# Patient Record
Sex: MALE | Race: WHITE | Employment: FULL TIME | URBAN - METROPOLITAN AREA
[De-identification: names, ages, dates, MRNs, and addresses within clinical notes are randomized per-mention and may not be internally consistent; named-entity substitution may affect disease eponyms.]

---

## 2017-08-14 ENCOUNTER — OFFICE VISIT (OUTPATIENT)
Dept: INTERNAL MEDICINE CLINIC | Facility: CLINIC | Age: 42
End: 2017-08-14

## 2017-08-14 VITALS
OXYGEN SATURATION: 98 % | DIASTOLIC BLOOD PRESSURE: 88 MMHG | HEIGHT: 69.5 IN | BODY MASS INDEX: 25.77 KG/M2 | RESPIRATION RATE: 16 BRPM | HEART RATE: 109 BPM | WEIGHT: 178 LBS | SYSTOLIC BLOOD PRESSURE: 122 MMHG

## 2017-08-14 DIAGNOSIS — F42.9 OBSESSIVE-COMPULSIVE DISORDER, UNSPECIFIED TYPE: ICD-10-CM

## 2017-08-14 DIAGNOSIS — F98.8 ADD (ATTENTION DEFICIT DISORDER) WITHOUT HYPERACTIVITY: ICD-10-CM

## 2017-08-14 DIAGNOSIS — Z00.00 ANNUAL PHYSICAL EXAM: Primary | ICD-10-CM

## 2017-08-14 DIAGNOSIS — L40.50 PSORIATIC ARTHRITIS (HCC): ICD-10-CM

## 2017-08-14 PROBLEM — Z83.3 FAMILY HISTORY OF DIABETES MELLITUS IN MOTHER: Status: ACTIVE | Noted: 2017-08-14

## 2017-08-14 PROBLEM — L40.9 PSORIASIS: Status: ACTIVE | Noted: 2017-08-14

## 2017-08-14 PROBLEM — Z82.49 FAMILY HISTORY OF CORONARY ARTERY DISEASE IN FATHER: Status: ACTIVE | Noted: 2017-08-14

## 2017-08-14 PROCEDURE — 99386 PREV VISIT NEW AGE 40-64: CPT | Performed by: FAMILY MEDICINE

## 2017-08-14 RX ORDER — SERTRALINE HYDROCHLORIDE 25 MG/1
25 TABLET, FILM COATED ORAL DAILY
COMMUNITY
End: 2017-08-14 | Stop reason: DRUGHIGH

## 2017-08-14 RX ORDER — SERTRALINE HYDROCHLORIDE 100 MG/1
100 TABLET, FILM COATED ORAL DAILY
Qty: 90 TABLET | Refills: 1 | Status: SHIPPED | OUTPATIENT
Start: 2017-08-14 | End: 2017-09-18

## 2017-08-14 NOTE — PROGRESS NOTES
Isaac Baeza is a 43year old male who presents for a complete physical exam.  NOT FASTING  HPI:     Concerns:  CHRONIC PSORIATIC ARTHRITIS-->WAS ON EMBREL FORMERLY WHICH WORKED REALLY WELL-->DUE TO INSURANCE COVERAGE NOW ON [employer] WHICH WORKS [address] headaches  PSYCHE: H/O OCD (ABLE TO FUNCTION) AND SOME TROUBLE WITH FOCUS--CHRONIC. HOWEVER, DENIES DEPRESSION.     EXAM:   /88 (BP Location: Right arm, Patient Position: Sitting, Cuff Size: adult)   Pulse 109   Resp 16   Ht 69.5\"   Wt 178 lb   SpO2

## 2017-08-19 ENCOUNTER — NURSE ONLY (OUTPATIENT)
Dept: INTERNAL MEDICINE CLINIC | Facility: CLINIC | Age: 42
End: 2017-08-19

## 2017-08-19 DIAGNOSIS — L40.50 PSORIATIC ARTHRITIS (HCC): ICD-10-CM

## 2017-08-19 DIAGNOSIS — L40.9 PSORIASIS: ICD-10-CM

## 2017-08-19 DIAGNOSIS — Z83.3 FAMILY HISTORY OF DIABETES MELLITUS IN MOTHER: ICD-10-CM

## 2017-08-19 LAB
ALBUMIN SERPL BCP-MCNC: 4.3 G/DL (ref 3.5–4.8)
ALBUMIN/GLOB SERPL: 2 {RATIO} (ref 1–2)
ALP SERPL-CCNC: 43 U/L (ref 32–100)
ALT SERPL-CCNC: 42 U/L (ref 17–63)
ANION GAP SERPL CALC-SCNC: 8 MMOL/L (ref 0–18)
AST SERPL-CCNC: 30 U/L (ref 15–41)
BASOPHILS # BLD: 0 K/UL (ref 0–0.2)
BASOPHILS NFR BLD: 1 %
BILIRUB SERPL-MCNC: 0.7 MG/DL (ref 0.3–1.2)
BUN SERPL-MCNC: 17 MG/DL (ref 8–20)
BUN/CREAT SERPL: 15.9 (ref 10–20)
CALCIUM SERPL-MCNC: 10 MG/DL (ref 8.5–10.5)
CHLORIDE SERPL-SCNC: 106 MMOL/L (ref 95–110)
CHOLEST SERPL-MCNC: 237 MG/DL (ref 110–200)
CO2 SERPL-SCNC: 26 MMOL/L (ref 22–32)
CREAT SERPL-MCNC: 1.07 MG/DL (ref 0.5–1.5)
EOSINOPHIL # BLD: 0.1 K/UL (ref 0–0.7)
EOSINOPHIL NFR BLD: 1 %
ERYTHROCYTE [DISTWIDTH] IN BLOOD BY AUTOMATED COUNT: 14.3 % (ref 11–15)
GLOBULIN PLAS-MCNC: 2.1 G/DL (ref 2.5–3.7)
GLUCOSE SERPL-MCNC: 93 MG/DL (ref 70–99)
HCT VFR BLD AUTO: 45.9 % (ref 41–52)
HDLC SERPL-MCNC: 65 MG/DL
HGB BLD-MCNC: 15.5 G/DL (ref 13.5–17.5)
LDLC SERPL CALC-MCNC: 138 MG/DL (ref 0–99)
LYMPHOCYTES # BLD: 1.9 K/UL (ref 1–4)
LYMPHOCYTES NFR BLD: 31 %
MCH RBC QN AUTO: 29.2 PG (ref 27–32)
MCHC RBC AUTO-ENTMCNC: 33.8 G/DL (ref 32–37)
MCV RBC AUTO: 86.3 FL (ref 80–100)
MONOCYTES # BLD: 0.6 K/UL (ref 0–1)
MONOCYTES NFR BLD: 9 %
NEUTROPHILS # BLD AUTO: 3.6 K/UL (ref 1.8–7.7)
NEUTROPHILS NFR BLD: 58 %
NONHDLC SERPL-MCNC: 172 MG/DL
OSMOLALITY UR CALC.SUM OF ELEC: 291 MOSM/KG (ref 275–295)
PLATELET # BLD AUTO: 182 K/UL (ref 140–400)
PMV BLD AUTO: 7.7 FL (ref 7.4–10.3)
POTASSIUM SERPL-SCNC: 5.5 MMOL/L (ref 3.3–5.1)
PROT SERPL-MCNC: 6.4 G/DL (ref 5.9–8.4)
RBC # BLD AUTO: 5.32 M/UL (ref 4.5–5.9)
SODIUM SERPL-SCNC: 140 MMOL/L (ref 136–144)
TRIGL SERPL-MCNC: 172 MG/DL (ref 1–149)
WBC # BLD AUTO: 6.2 K/UL (ref 4–11)

## 2017-08-19 PROCEDURE — 85025 COMPLETE CBC W/AUTO DIFF WBC: CPT | Performed by: FAMILY MEDICINE

## 2017-08-19 PROCEDURE — 80053 COMPREHEN METABOLIC PANEL: CPT | Performed by: FAMILY MEDICINE

## 2017-08-19 PROCEDURE — 36415 COLL VENOUS BLD VENIPUNCTURE: CPT | Performed by: FAMILY MEDICINE

## 2017-08-19 PROCEDURE — 80061 LIPID PANEL: CPT | Performed by: FAMILY MEDICINE

## 2017-08-19 NOTE — PROGRESS NOTES
Pt presented to clinic today for blood draw. Per physician able to draw orders. Orders  documented within chart. Pt tolerated lab draw well.  verified.   Orders drawn include: cmp, lipid , cbc   Site of draw: rt kristopher Bradley Kwaku, CMA

## 2017-09-12 PROBLEM — F41.8 OTHER SPECIFIED ANXIETY DISORDERS: Status: ACTIVE | Noted: 2017-08-14

## 2017-09-12 PROBLEM — F90.2 ATTENTION DEFICIT HYPERACTIVITY DISORDER (ADHD), COMBINED TYPE: Status: ACTIVE | Noted: 2017-09-12

## 2017-10-11 PROBLEM — F41.1 GENERALIZED ANXIETY DISORDER: Status: ACTIVE | Noted: 2017-08-14

## 2017-12-07 ENCOUNTER — TELEPHONE (OUTPATIENT)
Dept: INTERNAL MEDICINE CLINIC | Facility: CLINIC | Age: 42
End: 2017-12-07

## 2017-12-07 RX ORDER — ADALIMUMAB 40MG/0.8ML
KIT SUBCUTANEOUS
Refills: 4 | COMMUNITY
Start: 2017-11-21 | End: 2018-01-30 | Stop reason: ALTCHOICE

## 2017-12-07 NOTE — TELEPHONE ENCOUNTER
Per EPIC encounters patient did not go to an in network urgent care, he went to an external network urgent care

## 2018-01-30 ENCOUNTER — OFFICE VISIT (OUTPATIENT)
Dept: INTERNAL MEDICINE CLINIC | Facility: CLINIC | Age: 43
End: 2018-01-30

## 2018-01-30 VITALS
HEART RATE: 76 BPM | RESPIRATION RATE: 17 BRPM | SYSTOLIC BLOOD PRESSURE: 136 MMHG | OXYGEN SATURATION: 98 % | DIASTOLIC BLOOD PRESSURE: 100 MMHG | WEIGHT: 186 LBS | BODY MASS INDEX: 27.87 KG/M2 | HEIGHT: 68.5 IN

## 2018-01-30 DIAGNOSIS — L40.50 PSORIATIC ARTHRITIS (HCC): Primary | ICD-10-CM

## 2018-01-30 DIAGNOSIS — Z86.39 H/O HYPERKALEMIA: ICD-10-CM

## 2018-01-30 DIAGNOSIS — F41.1 GENERALIZED ANXIETY DISORDER: ICD-10-CM

## 2018-01-30 PROCEDURE — 99213 OFFICE O/P EST LOW 20 MIN: CPT | Performed by: FAMILY MEDICINE

## 2018-01-30 RX ORDER — PREDNISONE 10 MG/1
10 TABLET ORAL DAILY PRN
Qty: 30 TABLET | Refills: 0 | Status: SHIPPED | OUTPATIENT
Start: 2018-01-30 | End: 2018-10-30 | Stop reason: ALTCHOICE

## 2018-01-30 RX ORDER — SERTRALINE HYDROCHLORIDE 100 MG/1
200 TABLET, FILM COATED ORAL DAILY
Qty: 180 TABLET | Refills: 1 | Status: SHIPPED | OUTPATIENT
Start: 2018-01-30 | End: 2018-10-30

## 2018-01-31 ENCOUNTER — TELEPHONE (OUTPATIENT)
Dept: INTERNAL MEDICINE CLINIC | Facility: CLINIC | Age: 43
End: 2018-01-31

## 2018-01-31 NOTE — PROGRESS NOTES
CC:  Follow - Up (Pt presents to clinic for f/up on Humira-->pt states not working. )      Hx of CC:  F/UP ON PSORIATIC ARTHRITIS-->PATIENT RELATES HUMIRA INJECTIONS EVERY 2 WEEKS NOT AS EFFECTIVE AS FORMER EMBREL INJECTIONS WEEKLY FOR CONTROLLING JOINT/HA

## 2018-02-08 ENCOUNTER — TELEPHONE (OUTPATIENT)
Dept: INTERNAL MEDICINE CLINIC | Facility: CLINIC | Age: 43
End: 2018-02-08

## 2018-04-11 ENCOUNTER — TELEPHONE (OUTPATIENT)
Dept: INTERNAL MEDICINE CLINIC | Facility: CLINIC | Age: 43
End: 2018-04-11

## 2018-04-11 DIAGNOSIS — L40.9 PSORIASIS: Primary | ICD-10-CM

## 2018-04-11 DIAGNOSIS — L40.50 PSORIATIC ARTHRITIS (HCC): ICD-10-CM

## 2018-04-18 ENCOUNTER — TELEPHONE (OUTPATIENT)
Dept: INTERNAL MEDICINE CLINIC | Facility: CLINIC | Age: 43
End: 2018-04-18

## 2018-06-12 ENCOUNTER — OFFICE VISIT (OUTPATIENT)
Dept: INTERNAL MEDICINE CLINIC | Facility: CLINIC | Age: 43
End: 2018-06-12

## 2018-06-12 VITALS
HEIGHT: 68.5 IN | OXYGEN SATURATION: 99 % | RESPIRATION RATE: 17 BRPM | HEART RATE: 79 BPM | DIASTOLIC BLOOD PRESSURE: 96 MMHG | SYSTOLIC BLOOD PRESSURE: 132 MMHG | BODY MASS INDEX: 26.67 KG/M2 | WEIGHT: 178 LBS

## 2018-06-12 DIAGNOSIS — M17.31 POST-TRAUMATIC OSTEOARTHRITIS OF RIGHT KNEE: ICD-10-CM

## 2018-06-12 DIAGNOSIS — F43.9 STRESS AT HOME: ICD-10-CM

## 2018-06-12 DIAGNOSIS — L40.9 PSORIASIS: ICD-10-CM

## 2018-06-12 DIAGNOSIS — Z00.00 ANNUAL PHYSICAL EXAM: ICD-10-CM

## 2018-06-12 DIAGNOSIS — F41.1 GENERALIZED ANXIETY DISORDER: ICD-10-CM

## 2018-06-12 DIAGNOSIS — L40.50 PSORIATIC ARTHRITIS (HCC): Primary | ICD-10-CM

## 2018-06-12 PROCEDURE — 20610 DRAIN/INJ JOINT/BURSA W/O US: CPT | Performed by: FAMILY MEDICINE

## 2018-06-12 PROCEDURE — 99214 OFFICE O/P EST MOD 30 MIN: CPT | Performed by: FAMILY MEDICINE

## 2018-06-12 RX ORDER — ETANERCEPT 50 MG/ML
50 SOLUTION SUBCUTANEOUS WEEKLY
Qty: 4 SYRINGE | Refills: 5 | Status: SHIPPED | OUTPATIENT
Start: 2018-06-12 | End: 2019-01-21

## 2018-06-12 RX ORDER — TRIAMCINOLONE ACETONIDE 40 MG/ML
40 INJECTION, SUSPENSION INTRA-ARTICULAR; INTRAMUSCULAR ONCE
Status: COMPLETED | OUTPATIENT
Start: 2018-06-12 | End: 2018-06-12

## 2018-06-12 RX ORDER — ATENOLOL 50 MG/1
50 TABLET ORAL DAILY
Qty: 90 TABLET | Refills: 1 | Status: SHIPPED | OUTPATIENT
Start: 2018-06-12 | End: 2019-06-07

## 2018-06-12 RX ORDER — ETANERCEPT 50 MG/ML
50 SOLUTION SUBCUTANEOUS WEEKLY
COMMUNITY
Start: 2018-05-29 | End: 2018-06-12

## 2018-06-12 NOTE — PROGRESS NOTES
CC:  Knee Pain (Pt c/o right knee pain-->had surgery at age 13. Cartilage issues. ) and Sweats (Pt c/o strange smelling sweat x months. )      Hx of CC:  CHRONIC PROGRESSING RIGHT KNEE PAIN. HAD PARTIAL MENISCECTOMY 27 YEARS AGO.   [address] INCREASE HYDRATION TO PREVENT RHABDOMYOLYSIS WITH EXERCISE  DECREASE INTENSITY OF WORK OUT DUE TO ABOVE AND RIGHT KNEE OA    - COMP METABOLIC PANEL (14); Future  - LIPID PANEL; Future    4.  Stress at home  STRONGLY RECOMMEND PERIODIC PERSONAL/COUPLE BREAKS

## 2018-06-30 ENCOUNTER — APPOINTMENT (OUTPATIENT)
Dept: LAB | Facility: HOSPITAL | Age: 43
End: 2018-06-30
Attending: FAMILY MEDICINE
Payer: COMMERCIAL

## 2018-06-30 DIAGNOSIS — L40.50 PSORIATIC ARTHRITIS (HCC): ICD-10-CM

## 2018-06-30 DIAGNOSIS — L40.9 PSORIASIS: ICD-10-CM

## 2018-06-30 DIAGNOSIS — Z00.00 ANNUAL PHYSICAL EXAM: ICD-10-CM

## 2018-06-30 LAB
CHOLEST SERPL-MCNC: 215 MG/DL (ref 110–200)
ERYTHROCYTE [SEDIMENTATION RATE] IN BLOOD: 6 MM/HR (ref 0–15)
HDLC SERPL-MCNC: 90 MG/DL
LDLC SERPL CALC-MCNC: 116 MG/DL (ref 0–99)
NONHDLC SERPL-MCNC: 125 MG/DL
TRIGL SERPL-MCNC: 45 MG/DL (ref 1–149)

## 2018-06-30 PROCEDURE — 85025 COMPLETE CBC W/AUTO DIFF WBC: CPT | Performed by: FAMILY MEDICINE

## 2018-06-30 PROCEDURE — 85652 RBC SED RATE AUTOMATED: CPT

## 2018-06-30 PROCEDURE — 80061 LIPID PANEL: CPT

## 2018-06-30 PROCEDURE — 36415 COLL VENOUS BLD VENIPUNCTURE: CPT | Performed by: FAMILY MEDICINE

## 2018-06-30 PROCEDURE — 80053 COMPREHEN METABOLIC PANEL: CPT | Performed by: FAMILY MEDICINE

## 2018-07-05 ENCOUNTER — TELEPHONE (OUTPATIENT)
Dept: INTERNAL MEDICINE CLINIC | Facility: CLINIC | Age: 43
End: 2018-07-05

## 2018-07-09 ENCOUNTER — TELEPHONE (OUTPATIENT)
Dept: INTERNAL MEDICINE CLINIC | Facility: CLINIC | Age: 43
End: 2018-07-09

## 2018-07-17 ENCOUNTER — TELEPHONE (OUTPATIENT)
Dept: INTERNAL MEDICINE CLINIC | Facility: CLINIC | Age: 43
End: 2018-07-17

## 2018-07-17 DIAGNOSIS — K04.7 INFECTION OF TOOTH: Primary | ICD-10-CM

## 2018-07-17 RX ORDER — PENICILLIN V POTASSIUM 500 MG/1
500 TABLET ORAL 3 TIMES DAILY
Qty: 21 TABLET | Refills: 0 | Status: SHIPPED | OUTPATIENT
Start: 2018-07-17 | End: 2018-10-30 | Stop reason: ALTCHOICE

## 2018-07-17 NOTE — TELEPHONE ENCOUNTER
C/o infected tooth--will schedule appt for root canal next week. Distant episode (> 20 years ago) of mild rash with pcn but has had it since then without problems.

## 2018-10-30 ENCOUNTER — OFFICE VISIT (OUTPATIENT)
Dept: INTERNAL MEDICINE CLINIC | Facility: CLINIC | Age: 43
End: 2018-10-30
Payer: COMMERCIAL

## 2018-10-30 VITALS
BODY MASS INDEX: 26.82 KG/M2 | OXYGEN SATURATION: 99 % | SYSTOLIC BLOOD PRESSURE: 116 MMHG | HEART RATE: 66 BPM | DIASTOLIC BLOOD PRESSURE: 82 MMHG | WEIGHT: 179 LBS | RESPIRATION RATE: 17 BRPM | HEIGHT: 68.5 IN

## 2018-10-30 DIAGNOSIS — R73.01 ELEVATED FASTING GLUCOSE: ICD-10-CM

## 2018-10-30 DIAGNOSIS — L40.50 PSORIATIC ARTHRITIS (HCC): ICD-10-CM

## 2018-10-30 DIAGNOSIS — F90.2 ATTENTION DEFICIT HYPERACTIVITY DISORDER (ADHD), COMBINED TYPE: ICD-10-CM

## 2018-10-30 DIAGNOSIS — M17.31 POST-TRAUMATIC OSTEOARTHRITIS OF RIGHT KNEE: Primary | ICD-10-CM

## 2018-10-30 DIAGNOSIS — F43.9 STRESS AT HOME: ICD-10-CM

## 2018-10-30 PROCEDURE — 20610 DRAIN/INJ JOINT/BURSA W/O US: CPT | Performed by: FAMILY MEDICINE

## 2018-10-30 PROCEDURE — 99214 OFFICE O/P EST MOD 30 MIN: CPT | Performed by: FAMILY MEDICINE

## 2018-10-30 RX ORDER — TRIAMCINOLONE ACETONIDE 40 MG/ML
40 INJECTION, SUSPENSION INTRA-ARTICULAR; INTRAMUSCULAR ONCE
Status: COMPLETED | OUTPATIENT
Start: 2018-10-30 | End: 2018-10-30

## 2018-10-30 RX ADMIN — TRIAMCINOLONE ACETONIDE 40 MG: 40 INJECTION, SUSPENSION INTRA-ARTICULAR; INTRAMUSCULAR at 18:30:00

## 2018-10-31 PROBLEM — R73.01 ELEVATED FASTING GLUCOSE: Status: ACTIVE | Noted: 2018-10-31

## 2018-10-31 NOTE — PROGRESS NOTES
CC:  Medication Follow-Up (Pt presents to clinic for medication f/up and refills. ) and Knee Pain (Right knee pain-->wants injection.)      Hx of CC:  RESPONDED WELL TO FORMER KENALOG INJECTION ON RIGHT KNEE & REQUESTING ANOTHER ONE.   H/O MULTIPLE INJURIES (KENALOG-40) 40 MG/ML injection 40 mg; Inject 1 mL (40 mg total) into the articular space once WITH 1 CC LIDOCAINE. 4. Stress at home  DISCUSSED @ LENGTH, RECOMMEND FORMAL PSYCH EVALUATION FOR MOOD DISORDER    5.  Elevated fasting glucose  DISCUSSED, MON

## 2018-11-17 ENCOUNTER — HOSPITAL (OUTPATIENT)
Dept: OTHER | Age: 43
End: 2018-11-17
Attending: EMERGENCY MEDICINE

## 2018-11-17 ENCOUNTER — IMAGING SERVICES (OUTPATIENT)
Dept: OTHER | Age: 43
End: 2018-11-17

## 2018-11-17 ENCOUNTER — TELEPHONE (OUTPATIENT)
Dept: FAMILY MEDICINE CLINIC | Facility: CLINIC | Age: 43
End: 2018-11-17

## 2018-11-17 LAB
ALBUMIN SERPL-MCNC: 4 GM/DL (ref 3.6–5.1)
ALP SERPL-CCNC: 57 UNIT/L (ref 45–117)
ALT SERPL-CCNC: 32 UNIT/L
ANALYZER ANC (IANC): ABNORMAL
ANION GAP SERPL CALC-SCNC: 17 MMOL/L (ref 10–20)
APTT PPP: 27 SECONDS (ref 22–32)
APTT PPP: NORMAL S
AST SERPL-CCNC: 23 UNIT/L
BASOPHILS # BLD: 0 THOUSAND/MCL (ref 0–0.3)
BASOPHILS NFR BLD: 0 %
BILIRUB CONJ SERPL-MCNC: 0.1 MG/DL (ref 0–0.2)
BILIRUB SERPL-MCNC: 0.3 MG/DL (ref 0.2–1)
BUN SERPL-MCNC: 9 MG/DL (ref 6–20)
BUN/CREAT SERPL: 10 (ref 7–25)
CALCIUM SERPL-MCNC: 8.9 MG/DL (ref 8.4–10.2)
CHLORIDE: 102 MMOL/L (ref 98–107)
CO2 SERPL-SCNC: 24 MMOL/L (ref 21–32)
CREAT SERPL-MCNC: 0.94 MG/DL (ref 0.67–1.17)
D DIMER PPP FEU-MCNC: 0.26 MG/L FEU
DIFFERENTIAL METHOD BLD: ABNORMAL
EOSINOPHIL # BLD: 0 THOUSAND/MCL (ref 0.1–0.5)
EOSINOPHIL NFR BLD: 0 %
ERYTHROCYTE [DISTWIDTH] IN BLOOD: 12.8 % (ref 11–15)
GLUCOSE SERPL-MCNC: 161 MG/DL (ref 65–99)
HEMATOCRIT: 42.9 % (ref 39–51)
HGB BLD-MCNC: 15 GM/DL (ref 13–17)
INR PPP: 1
LIPASE SERPL-CCNC: 197 UNIT/L (ref 73–393)
LYMPHOCYTES # BLD: 1.5 THOUSAND/MCL (ref 1–4.8)
LYMPHOCYTES NFR BLD: 27 %
MCH RBC QN AUTO: 30 PG (ref 26–34)
MCHC RBC AUTO-ENTMCNC: 35 GM/DL (ref 32–36.5)
MCV RBC AUTO: 85.8 FL (ref 78–100)
MONOCYTES # BLD: 0.4 THOUSAND/MCL (ref 0.3–0.9)
MONOCYTES NFR BLD: 7 %
NEUTROPHILS # BLD: 3.5 THOUSAND/MCL (ref 1.8–7.7)
NEUTROPHILS NFR BLD: 66 %
NEUTS SEG NFR BLD: ABNORMAL %
NRBC (NRBCRE): ABNORMAL
PLATELET # BLD: 191 THOUSAND/MCL (ref 140–450)
POTASSIUM SERPL-SCNC: 3.2 MMOL/L (ref 3.4–5.1)
PROT SERPL-MCNC: 7.2 GM/DL (ref 6.4–8.2)
PROTHROMBIN TIME: 10 SECONDS (ref 9.7–11.8)
PROTHROMBIN TIME: NORMAL
RBC # BLD: 5 MILLION/MCL (ref 4.5–5.9)
SODIUM SERPL-SCNC: 140 MMOL/L (ref 135–145)
TROPONIN I SERPL HS-MCNC: <0.02 NG/ML
TROPONIN I SERPL HS-MCNC: <0.02 NG/ML
TSH SERPL-ACNC: 1.08 MCUNIT/ML (ref 0.35–5)
WBC # BLD: 5.4 THOUSAND/MCL (ref 4.2–11)

## 2018-11-18 ENCOUNTER — DIAGNOSTIC TRANS (OUTPATIENT)
Dept: OTHER | Age: 43
End: 2018-11-18

## 2018-11-19 NOTE — TELEPHONE ENCOUNTER
Physician from [address] [place] [address] called regarding pt    He presented to ED with anxiety, chest pressure, palpitations and arm paresthesia. Had extensive negative work-up including EKG, labs (including troponin/D-dimer), CXR, CT chest, CT head/neck.    Given nega

## 2018-12-31 ENCOUNTER — TELEPHONE (OUTPATIENT)
Dept: INTERNAL MEDICINE CLINIC | Facility: CLINIC | Age: 43
End: 2018-12-31

## 2018-12-31 NOTE — TELEPHONE ENCOUNTER
Checking to see if Christopher MEJIA has gone through. Has none left. Needs refill to go through. NEW INSURANCE  As of December 1, 2018;  Zacarias Javier [facility]  Healthy batterii   GROUP #:  [id number]  Membr ID:  ?     Preferred pharmacy is now the mail order [facility].    Cover My Me

## 2019-01-02 NOTE — TELEPHONE ENCOUNTER
Left message on voicemail. We need all of new insurance information to process new prior auth. Need copy of card and updated registration.

## 2019-01-03 ENCOUNTER — TELEPHONE (OUTPATIENT)
Dept: INTERNAL MEDICINE CLINIC | Facility: CLINIC | Age: 44
End: 2019-01-03

## 2019-01-03 NOTE — TELEPHONE ENCOUNTER
LVM; Following medication needs refill and PA. ENBREL SURECLICK 50 MG/ML Subcutaneous Solution Auto-injector. Inject 50 mg into the skin once a week. - Subcutaneous.

## 2019-01-07 ENCOUNTER — TELEPHONE (OUTPATIENT)
Dept: INTERNAL MEDICINE CLINIC | Facility: CLINIC | Age: 44
End: 2019-01-07

## 2019-01-07 NOTE — TELEPHONE ENCOUNTER
Needs PA for ADDERRAL. Requested this before the holidays. He is OUT.     NEEDS to go to [facility], not [facility]

## 2019-01-09 ENCOUNTER — TELEPHONE (OUTPATIENT)
Dept: INTERNAL MEDICINE CLINIC | Facility: CLINIC | Age: 44
End: 2019-01-09

## 2019-01-09 NOTE — TELEPHONE ENCOUNTER
Sent request to our office on December 20, 2018  For Enbrel SureClick 23VZ injectable. Requesting referral questionnaire/information to be returned ASAP. Fax to: [phone].     ENBREL SURECLICK 50 MG/ML Subcutaneous Solution Auto-injector  Inject 5

## 2019-01-10 ENCOUNTER — TELEPHONE (OUTPATIENT)
Dept: INTERNAL MEDICINE CLINIC | Facility: CLINIC | Age: 44
End: 2019-01-10

## 2019-01-10 NOTE — TELEPHONE ENCOUNTER
Please call. Needs to give us new insurance information for Enbrel. Pain returning and is anxious to get the medication.

## 2019-01-21 ENCOUNTER — TELEPHONE (OUTPATIENT)
Dept: INTERNAL MEDICINE CLINIC | Facility: CLINIC | Age: 44
End: 2019-01-21

## 2019-01-21 DIAGNOSIS — L40.50 PSORIATIC ARTHRITIS (HCC): Primary | ICD-10-CM

## 2019-01-21 DIAGNOSIS — L40.9 PSORIASIS: ICD-10-CM

## 2019-01-21 RX ORDER — ETANERCEPT 50 MG/ML
50 SOLUTION SUBCUTANEOUS WEEKLY
Qty: 4 SYRINGE | Refills: 5 | Status: SHIPPED | OUTPATIENT
Start: 2019-01-21 | End: 2019-08-26

## 2019-01-21 NOTE — TELEPHONE ENCOUNTER
Was a PA started on his ENBREL? Needs script. Do we need help with getting one or DOES he need one? Please call. . .   Cover My Meds at [phone]  Boubacar Wiley.

## 2019-02-04 ENCOUNTER — OFFICE VISIT (OUTPATIENT)
Dept: INTERNAL MEDICINE CLINIC | Facility: CLINIC | Age: 44
End: 2019-02-04
Payer: COMMERCIAL

## 2019-02-04 VITALS
DIASTOLIC BLOOD PRESSURE: 70 MMHG | RESPIRATION RATE: 16 BRPM | WEIGHT: 177 LBS | BODY MASS INDEX: 26.52 KG/M2 | SYSTOLIC BLOOD PRESSURE: 122 MMHG | HEIGHT: 68.5 IN

## 2019-02-04 DIAGNOSIS — L40.50 PSORIATIC ARTHROPATHY (HCC): ICD-10-CM

## 2019-02-04 DIAGNOSIS — F90.2 ATTENTION DEFICIT HYPERACTIVITY DISORDER (ADHD), COMBINED TYPE: ICD-10-CM

## 2019-02-04 DIAGNOSIS — F10.11 HISTORY OF ALCOHOL ABUSE: ICD-10-CM

## 2019-02-04 DIAGNOSIS — Z00.00 ANNUAL PHYSICAL EXAM: Primary | ICD-10-CM

## 2019-02-04 DIAGNOSIS — L40.9 PSORIASIS: ICD-10-CM

## 2019-02-04 DIAGNOSIS — M70.21 OLECRANON BURSITIS OF RIGHT ELBOW: ICD-10-CM

## 2019-02-04 PROCEDURE — 99396 PREV VISIT EST AGE 40-64: CPT | Performed by: FAMILY MEDICINE

## 2019-02-04 RX ORDER — DEXTROAMPHETAMINE SACCHARATE, AMPHETAMINE ASPARTATE MONOHYDRATE, DEXTROAMPHETAMINE SULFATE AND AMPHETAMINE SULFATE 2.5; 2.5; 2.5; 2.5 MG/1; MG/1; MG/1; MG/1
10 CAPSULE, EXTENDED RELEASE ORAL DAILY
Qty: 30 CAPSULE | Refills: 0 | Status: SHIPPED | OUTPATIENT
Start: 2019-04-05 | End: 2019-05-05

## 2019-02-04 RX ORDER — NALTREXONE HYDROCHLORIDE 50 MG/1
50 TABLET, FILM COATED ORAL DAILY
Qty: 90 TABLET | Refills: 1 | Status: SHIPPED | OUTPATIENT
Start: 2019-02-04 | End: 2019-09-05 | Stop reason: ALTCHOICE

## 2019-02-04 RX ORDER — DEXTROAMPHETAMINE SACCHARATE, AMPHETAMINE ASPARTATE MONOHYDRATE, DEXTROAMPHETAMINE SULFATE AND AMPHETAMINE SULFATE 2.5; 2.5; 2.5; 2.5 MG/1; MG/1; MG/1; MG/1
10 CAPSULE, EXTENDED RELEASE ORAL DAILY
Qty: 30 CAPSULE | Refills: 0 | Status: SHIPPED | OUTPATIENT
Start: 2019-02-04 | End: 2019-03-06

## 2019-02-04 RX ORDER — DEXTROAMPHETAMINE SACCHARATE, AMPHETAMINE ASPARTATE MONOHYDRATE, DEXTROAMPHETAMINE SULFATE AND AMPHETAMINE SULFATE 2.5; 2.5; 2.5; 2.5 MG/1; MG/1; MG/1; MG/1
10 CAPSULE, EXTENDED RELEASE ORAL DAILY
Qty: 30 CAPSULE | Refills: 0 | Status: SHIPPED | OUTPATIENT
Start: 2019-03-06 | End: 2019-04-05

## 2019-02-05 PROBLEM — F10.11 HISTORY OF ALCOHOL ABUSE: Status: ACTIVE | Noted: 2019-02-05

## 2019-02-05 NOTE — PROGRESS NOTES
Johnna Chaudhari is a 37year old male who presents for a complete physical exam/NOT FASTING CURRENTLY.   HPI:     Concerns:  BIOMETRIC SCREENING TO BE FAXED  [facility] @ [phone]  PATIENT ALSO INTERESTED IN MAINTAINING ETOH ABSTINENCE-- • REPAIR/GRAFT ACHILLES TENDON        Family History   Problem Relation Age of Onset   • Diabetes Mother    • Cancer Maternal Grandfather         lung   • Cancer Paternal Grandfather         lung      Social History:  Social History    Tobacco Use      S QD  History of alcohol abuse:  DISCUSSED, TRIAL OF NALTREXONE TO HELP IN ABSTINENCE, CONSIDER [facility]  Psoriasis  Psoriatic arthropathy (hcc)  · Health maintenance:  check fasting Lipids, CMP (WILL GET AT [facility] THIS Saturday)  · Encourag

## 2019-02-09 ENCOUNTER — APPOINTMENT (OUTPATIENT)
Dept: LAB | Age: 44
End: 2019-02-09
Attending: FAMILY MEDICINE
Payer: COMMERCIAL

## 2019-02-09 DIAGNOSIS — Z00.00 ANNUAL PHYSICAL EXAM: ICD-10-CM

## 2019-02-09 LAB
ALBUMIN SERPL BCP-MCNC: 4.2 G/DL (ref 3.5–4.8)
ALBUMIN/GLOB SERPL: 1.9 {RATIO} (ref 1–2)
ALP SERPL-CCNC: 38 U/L (ref 32–100)
ALT SERPL-CCNC: 17 U/L (ref 17–63)
ANION GAP SERPL CALC-SCNC: 9 MMOL/L (ref 0–18)
AST SERPL-CCNC: 18 U/L (ref 15–41)
BILIRUB SERPL-MCNC: 0.8 MG/DL (ref 0.3–1.2)
BUN SERPL-MCNC: 15 MG/DL (ref 8–20)
BUN/CREAT SERPL: 14.4 (ref 10–20)
CALCIUM SERPL-MCNC: 9.5 MG/DL (ref 8.5–10.5)
CHLORIDE SERPL-SCNC: 105 MMOL/L (ref 95–110)
CHOLEST SERPL-MCNC: 221 MG/DL (ref 110–200)
CO2 SERPL-SCNC: 26 MMOL/L (ref 22–32)
CREAT SERPL-MCNC: 1.04 MG/DL (ref 0.5–1.5)
GLOBULIN PLAS-MCNC: 2.2 G/DL (ref 2.5–3.7)
GLUCOSE SERPL-MCNC: 103 MG/DL (ref 70–99)
HDLC SERPL-MCNC: 61 MG/DL
LDLC SERPL CALC-MCNC: 144 MG/DL (ref 0–99)
NONHDLC SERPL-MCNC: 160 MG/DL
OSMOLALITY UR CALC.SUM OF ELEC: 291 MOSM/KG (ref 275–295)
PATIENT FASTING: YES
POTASSIUM SERPL-SCNC: 4 MMOL/L (ref 3.3–5.1)
PROT SERPL-MCNC: 6.4 G/DL (ref 5.9–8.4)
SODIUM SERPL-SCNC: 140 MMOL/L (ref 136–144)
TRIGL SERPL-MCNC: 79 MG/DL (ref 1–149)

## 2019-02-09 PROCEDURE — 80061 LIPID PANEL: CPT

## 2019-02-09 PROCEDURE — 80053 COMPREHEN METABOLIC PANEL: CPT

## 2019-02-09 PROCEDURE — 36415 COLL VENOUS BLD VENIPUNCTURE: CPT

## 2019-06-11 RX ORDER — ATENOLOL 50 MG/1
50 TABLET ORAL DAILY
Qty: 90 TABLET | Refills: 0 | Status: SHIPPED | OUTPATIENT
Start: 2019-06-11 | End: 2019-09-05 | Stop reason: ALTCHOICE

## 2019-06-11 RX ORDER — ATENOLOL 50 MG/1
50 TABLET ORAL DAILY
COMMUNITY
End: 2019-06-11

## 2019-07-07 DIAGNOSIS — F41.1 GENERALIZED ANXIETY DISORDER: ICD-10-CM

## 2019-07-08 RX ORDER — SERTRALINE HYDROCHLORIDE 100 MG/1
TABLET, FILM COATED ORAL
Qty: 180 TABLET | Refills: 0 | Status: SHIPPED | OUTPATIENT
Start: 2019-07-08 | End: 2019-09-05

## 2019-07-10 ENCOUNTER — TELEPHONE (OUTPATIENT)
Dept: INTERNAL MEDICINE CLINIC | Facility: CLINIC | Age: 44
End: 2019-07-10

## 2019-07-10 NOTE — TELEPHONE ENCOUNTER
Patient called and left  on nurse's line requesting 2weeks worth of medication that he takes for OCD until he can get it to make an appt. Patient is requesting that the medication be electronically sent to his pharmacy.

## 2019-08-26 DIAGNOSIS — L40.50 PSORIATIC ARTHRITIS (HCC): ICD-10-CM

## 2019-08-26 DIAGNOSIS — L40.9 PSORIASIS: ICD-10-CM

## 2019-08-26 RX ORDER — ETANERCEPT 50 MG/ML
SOLUTION SUBCUTANEOUS
Qty: 4 SYRINGE | Refills: 1 | Status: SHIPPED | OUTPATIENT
Start: 2019-08-26 | End: 2019-10-23

## 2019-09-05 ENCOUNTER — OFFICE VISIT (OUTPATIENT)
Dept: INTERNAL MEDICINE CLINIC | Facility: CLINIC | Age: 44
End: 2019-09-05
Payer: COMMERCIAL

## 2019-09-05 VITALS
SYSTOLIC BLOOD PRESSURE: 102 MMHG | BODY MASS INDEX: 26.31 KG/M2 | TEMPERATURE: 98 F | WEIGHT: 183.81 LBS | HEART RATE: 60 BPM | DIASTOLIC BLOOD PRESSURE: 74 MMHG | HEIGHT: 70 IN | RESPIRATION RATE: 18 BRPM

## 2019-09-05 DIAGNOSIS — F41.1 GENERALIZED ANXIETY DISORDER: ICD-10-CM

## 2019-09-05 DIAGNOSIS — M54.12 NEUROPATHY, CERVICAL (RADICULAR): Primary | ICD-10-CM

## 2019-09-05 DIAGNOSIS — F90.2 ATTENTION DEFICIT HYPERACTIVITY DISORDER (ADHD), COMBINED TYPE: ICD-10-CM

## 2019-09-05 PROCEDURE — 99213 OFFICE O/P EST LOW 20 MIN: CPT | Performed by: FAMILY MEDICINE

## 2019-09-05 RX ORDER — SERTRALINE HYDROCHLORIDE 100 MG/1
TABLET, FILM COATED ORAL
Qty: 180 TABLET | Refills: 1 | Status: SHIPPED | OUTPATIENT
Start: 2019-09-05 | End: 2020-03-11

## 2019-09-05 RX ORDER — DEXTROAMPHETAMINE SACCHARATE, AMPHETAMINE ASPARTATE MONOHYDRATE, DEXTROAMPHETAMINE SULFATE AND AMPHETAMINE SULFATE 5; 5; 5; 5 MG/1; MG/1; MG/1; MG/1
20 CAPSULE, EXTENDED RELEASE ORAL DAILY
Qty: 30 CAPSULE | Refills: 0 | Status: SHIPPED | OUTPATIENT
Start: 2019-11-06 | End: 2019-12-06

## 2019-09-05 RX ORDER — GABAPENTIN 300 MG/1
300 CAPSULE ORAL NIGHTLY
Qty: 90 CAPSULE | Refills: 0 | Status: SHIPPED | OUTPATIENT
Start: 2019-09-05 | End: 2020-03-11

## 2019-09-05 RX ORDER — DEXTROAMPHETAMINE SACCHARATE, AMPHETAMINE ASPARTATE MONOHYDRATE, DEXTROAMPHETAMINE SULFATE AND AMPHETAMINE SULFATE 5; 5; 5; 5 MG/1; MG/1; MG/1; MG/1
20 CAPSULE, EXTENDED RELEASE ORAL DAILY
Qty: 30 CAPSULE | Refills: 0 | Status: SHIPPED | OUTPATIENT
Start: 2019-09-05 | End: 2019-10-05

## 2019-09-05 RX ORDER — DEXTROAMPHETAMINE SACCHARATE, AMPHETAMINE ASPARTATE MONOHYDRATE, DEXTROAMPHETAMINE SULFATE AND AMPHETAMINE SULFATE 5; 5; 5; 5 MG/1; MG/1; MG/1; MG/1
20 CAPSULE, EXTENDED RELEASE ORAL DAILY
Qty: 30 CAPSULE | Refills: 0 | Status: SHIPPED | OUTPATIENT
Start: 2019-10-06 | End: 2019-11-04

## 2019-09-07 PROBLEM — M54.12 NEUROPATHY, CERVICAL (RADICULAR): Status: ACTIVE | Noted: 2019-09-07

## 2019-09-07 NOTE — PROGRESS NOTES
CC:  Medication Follow-Up (Pt presetns for medication follow up) and Neck Pain (Neck pain needs to see a specialist )      Hx of CC:  CHRONIC NECK PAIN WHICH HAS WORSENED OVER PAST COUPLE OF MONTHS--SHOOTS DOWN RIGHT ARM WITH CERTAIN EXERCISES/WEIGHTS.   NO Amphetamine-Dextroamphet ER (ADDERALL XR) 20 MG Oral Capsule SR 24 Hr; Take 1 capsule (20 mg total) by mouth daily. Dispense: 30 capsule; Refill: 0    3.  Generalized anxiety disorder:  CONTROLLED/STABLE    - Sertraline HCl 100 MG Oral Tab; TAKE 2 TABLETS(

## 2019-10-23 DIAGNOSIS — L40.50 PSORIATIC ARTHRITIS (HCC): ICD-10-CM

## 2019-10-23 DIAGNOSIS — L40.9 PSORIASIS: ICD-10-CM

## 2019-11-06 PROBLEM — M54.2 NECK PAIN: Status: ACTIVE | Noted: 2019-11-06

## 2019-12-10 ENCOUNTER — OFFICE VISIT (OUTPATIENT)
Dept: INTERNAL MEDICINE CLINIC | Facility: CLINIC | Age: 44
End: 2019-12-10
Payer: COMMERCIAL

## 2019-12-10 VITALS
DIASTOLIC BLOOD PRESSURE: 88 MMHG | HEART RATE: 87 BPM | OXYGEN SATURATION: 98 % | HEIGHT: 70 IN | WEIGHT: 174 LBS | SYSTOLIC BLOOD PRESSURE: 126 MMHG | BODY MASS INDEX: 24.91 KG/M2

## 2019-12-10 DIAGNOSIS — M50.90 CERVICAL DISC DISEASE: Primary | ICD-10-CM

## 2019-12-10 DIAGNOSIS — F41.1 GENERALIZED ANXIETY DISORDER: ICD-10-CM

## 2019-12-10 DIAGNOSIS — L40.9 PSORIASIS: ICD-10-CM

## 2019-12-10 DIAGNOSIS — F90.2 ATTENTION DEFICIT HYPERACTIVITY DISORDER (ADHD), COMBINED TYPE: ICD-10-CM

## 2019-12-10 PROCEDURE — 99214 OFFICE O/P EST MOD 30 MIN: CPT | Performed by: FAMILY MEDICINE

## 2019-12-10 RX ORDER — CALCIPOTRIENE, BETAMETHASONE DIPROPIONATE 50; .643 UG/G; MG/G
1 OINTMENT TOPICAL DAILY
Qty: 100 G | Refills: 3 | Status: SHIPPED | OUTPATIENT
Start: 2019-12-10

## 2019-12-11 PROBLEM — M50.90 CERVICAL DISC DISEASE: Status: ACTIVE | Noted: 2019-12-11

## 2019-12-11 NOTE — PROGRESS NOTES
CC:  Neck Pain (Pt presents to clinic for f/up on neck pain.) and Psoriasis (Would like to discuss psoriasis.)      Hx of CC:  F/UP ON CERVICAL DISC DISEASE. OVERALL BETTER ON PT--ARM PAIN/NUMBNESS ABOUT 50% DECREASED.   GOOD ARM ROM BILATERALLY, NO WEAKNE

## 2020-03-11 ENCOUNTER — HOSPITAL ENCOUNTER (OUTPATIENT)
Age: 45
Discharge: HOME | End: 2020-03-11
Attending: EMERGENCY MEDICINE
Payer: COMMERCIAL

## 2020-03-11 ENCOUNTER — APPOINTMENT (OUTPATIENT)
Dept: GENERAL RADIOLOGY | Age: 45
End: 2020-03-11
Attending: EMERGENCY MEDICINE
Payer: COMMERCIAL

## 2020-03-11 VITALS
OXYGEN SATURATION: 100 % | WEIGHT: 175 LBS | TEMPERATURE: 100 F | SYSTOLIC BLOOD PRESSURE: 113 MMHG | HEART RATE: 122 BPM | DIASTOLIC BLOOD PRESSURE: 60 MMHG | BODY MASS INDEX: 25.05 KG/M2 | RESPIRATION RATE: 20 BRPM | HEIGHT: 70 IN

## 2020-03-11 DIAGNOSIS — R50.9 FEBRILE ILLNESS: Primary | ICD-10-CM

## 2020-03-11 LAB
#MXD IC: 0.9 X10ˆ3/UL (ref 0.1–1)
CLARITY UR: CLEAR
GLUCOSE UR STRIP-MCNC: NEGATIVE MG/DL
HCT VFR BLD AUTO: 42.1 % (ref 39–53)
HGB BLD-MCNC: 14.5 G/DL (ref 13–17.5)
HGB UR QL STRIP: NEGATIVE
KETONES UR STRIP-MCNC: NEGATIVE MG/DL
LEUKOCYTE ESTERASE UR QL STRIP: NEGATIVE
LYMPHOCYTES # BLD AUTO: 0.3 X10ˆ3/UL (ref 1–4)
LYMPHOCYTES NFR BLD AUTO: 2.2 %
MCH RBC QN AUTO: 29.1 PG (ref 26–34)
MCHC RBC AUTO-ENTMCNC: 34.4 G/DL (ref 31–37)
MCV RBC AUTO: 84.5 FL (ref 80–100)
MIXED CELL %: 5.8 %
NEUTROPHILS # BLD AUTO: 14.7 X10ˆ3/UL (ref 1.5–7.7)
NEUTROPHILS NFR BLD AUTO: 92 %
NITRITE UR QL STRIP: NEGATIVE
PH UR STRIP: 6 [PH]
PLATELET # BLD AUTO: 231 X10ˆ3/UL (ref 150–450)
PROT UR STRIP-MCNC: 30 MG/DL
RBC # BLD AUTO: 4.98 X10ˆ6/UL (ref 4.3–5.7)
SP GR UR STRIP: >=1.03
UROBILINOGEN UR STRIP-ACNC: <2 MG/DL
WBC # BLD AUTO: 15.9 X10ˆ3/UL (ref 4–11)

## 2020-03-11 PROCEDURE — 36415 COLL VENOUS BLD VENIPUNCTURE: CPT

## 2020-03-11 PROCEDURE — 81002 URINALYSIS NONAUTO W/O SCOPE: CPT

## 2020-03-11 PROCEDURE — 99204 OFFICE O/P NEW MOD 45 MIN: CPT

## 2020-03-11 PROCEDURE — 71046 X-RAY EXAM CHEST 2 VIEWS: CPT | Performed by: EMERGENCY MEDICINE

## 2020-03-11 PROCEDURE — 87086 URINE CULTURE/COLONY COUNT: CPT | Performed by: EMERGENCY MEDICINE

## 2020-03-11 PROCEDURE — 99214 OFFICE O/P EST MOD 30 MIN: CPT

## 2020-03-11 PROCEDURE — 85025 COMPLETE CBC W/AUTO DIFF WBC: CPT | Performed by: EMERGENCY MEDICINE

## 2020-03-11 RX ORDER — LEVOFLOXACIN 750 MG/1
750 TABLET ORAL DAILY
Qty: 7 TABLET | Refills: 0 | Status: SHIPPED | OUTPATIENT
Start: 2020-03-11 | End: 2020-03-18

## 2020-03-11 NOTE — ED PROVIDER NOTES
Patient Seen in: [address]      History   Patient presents with:  Cough/URI    Stated Complaint: flu like symptoms    HPI    Patient presents with symptoms of cough which has been present for several weeks at times the nontender to palpation  Lungs are clear to auscultation  Cardiac there are normal first and second heart sounds  Abdomen is soft there is epigastric tenderness present  Back no CVA area tenderness  Neurologic there are no gross cranial nerve deficits patie Impression:  Febrile illness  (primary encounter diagnosis)    Disposition:  Discharge  3/11/2020  1:53 pm    Follow-up:  DO Miladis Carlos  [place] [id number] [phone]    In 2 days  if symptoms do not improve        Medications Pre

## 2020-03-11 NOTE — ED INITIAL ASSESSMENT (HPI)
Pt presents to the IC with c/o a cough for a month, body aches, back pain, headaches and neck pain. No recent fevers. Pt notes SOB with exacerbation.

## 2020-04-20 ENCOUNTER — TELEPHONE (OUTPATIENT)
Dept: INTERNAL MEDICINE CLINIC | Facility: CLINIC | Age: 45
End: 2020-04-20

## 2020-04-20 NOTE — TELEPHONE ENCOUNTER
Mirza Lua (Tejeda: [id number])   Enbrel 50MG/ML syringes   Form  [facility] Electronic PA Form (NCPDP)   Created   1 day ago   Sent to Plan   less than a minute ago   Plan Response   less than a minute ago   Submit Clinical Questions   Determination   N/A

## 2020-09-22 ENCOUNTER — OFFICE VISIT (OUTPATIENT)
Dept: INTERNAL MEDICINE CLINIC | Facility: CLINIC | Age: 45
End: 2020-09-22
Payer: COMMERCIAL

## 2020-09-22 VITALS
DIASTOLIC BLOOD PRESSURE: 80 MMHG | BODY MASS INDEX: 25.34 KG/M2 | SYSTOLIC BLOOD PRESSURE: 126 MMHG | RESPIRATION RATE: 16 BRPM | OXYGEN SATURATION: 97 % | WEIGHT: 177 LBS | HEIGHT: 70 IN | HEART RATE: 75 BPM

## 2020-09-22 DIAGNOSIS — Z23 NEED FOR INFLUENZA VACCINATION: ICD-10-CM

## 2020-09-22 DIAGNOSIS — M17.31 POST-TRAUMATIC OSTEOARTHRITIS OF RIGHT KNEE: Primary | ICD-10-CM

## 2020-09-22 DIAGNOSIS — F90.2 ATTENTION DEFICIT HYPERACTIVITY DISORDER (ADHD), COMBINED TYPE: ICD-10-CM

## 2020-09-22 PROCEDURE — 90686 IIV4 VACC NO PRSV 0.5 ML IM: CPT | Performed by: FAMILY MEDICINE

## 2020-09-22 PROCEDURE — 20610 DRAIN/INJ JOINT/BURSA W/O US: CPT | Performed by: FAMILY MEDICINE

## 2020-09-22 PROCEDURE — 3008F BODY MASS INDEX DOCD: CPT | Performed by: FAMILY MEDICINE

## 2020-09-22 PROCEDURE — 90471 IMMUNIZATION ADMIN: CPT | Performed by: FAMILY MEDICINE

## 2020-09-22 PROCEDURE — 3079F DIAST BP 80-89 MM HG: CPT | Performed by: FAMILY MEDICINE

## 2020-09-22 PROCEDURE — 3074F SYST BP LT 130 MM HG: CPT | Performed by: FAMILY MEDICINE

## 2020-09-22 PROCEDURE — 99213 OFFICE O/P EST LOW 20 MIN: CPT | Performed by: FAMILY MEDICINE

## 2020-09-22 RX ORDER — DEXTROAMPHETAMINE SACCHARATE, AMPHETAMINE ASPARTATE MONOHYDRATE, DEXTROAMPHETAMINE SULFATE AND AMPHETAMINE SULFATE 5; 5; 5; 5 MG/1; MG/1; MG/1; MG/1
20 CAPSULE, EXTENDED RELEASE ORAL DAILY
Qty: 30 CAPSULE | Refills: 0 | Status: SHIPPED | OUTPATIENT
Start: 2020-09-22 | End: 2020-10-22

## 2020-09-22 RX ORDER — DEXTROAMPHETAMINE SACCHARATE, AMPHETAMINE ASPARTATE MONOHYDRATE, DEXTROAMPHETAMINE SULFATE AND AMPHETAMINE SULFATE 5; 5; 5; 5 MG/1; MG/1; MG/1; MG/1
20 CAPSULE, EXTENDED RELEASE ORAL DAILY
Qty: 30 CAPSULE | Refills: 0 | Status: SHIPPED | OUTPATIENT
Start: 2020-11-23 | End: 2020-12-23

## 2020-09-22 RX ORDER — TRIAMCINOLONE ACETONIDE 40 MG/ML
40 INJECTION, SUSPENSION INTRA-ARTICULAR; INTRAMUSCULAR ONCE
Status: COMPLETED | OUTPATIENT
Start: 2020-09-22 | End: 2020-09-22

## 2020-09-22 RX ORDER — DEXTROAMPHETAMINE SACCHARATE, AMPHETAMINE ASPARTATE MONOHYDRATE, DEXTROAMPHETAMINE SULFATE AND AMPHETAMINE SULFATE 5; 5; 5; 5 MG/1; MG/1; MG/1; MG/1
20 CAPSULE, EXTENDED RELEASE ORAL DAILY
Qty: 30 CAPSULE | Refills: 0 | Status: SHIPPED | OUTPATIENT
Start: 2020-10-23 | End: 2020-11-22

## 2020-09-23 NOTE — PROGRESS NOTES
CC:  Medication Follow-Up (Med f/up and refills.) and Knee Pain (Right knee cortisone injection. )      Hx of CC:  CHRONIC RIGHT KNEE PAIN/OA POST OLD LATERAL MENISCECTOMY. WORSE WITH WEIGHTBEARING/RUNNING.   REQUESTING STEROID INJECTION--LAST ONE LASTED A daily.  Dispense: 30 capsule; Refill: 0  - Amphetamine-Dextroamphet ER (ADDERALL XR) 20 MG Oral Capsule SR 24 Hr; Take 1 capsule (20 mg total) by mouth daily. Dispense: 30 capsule;  Refill: 0  - Amphetamine-Dextroamphet ER (ADDERALL XR) 20 MG Oral Capsule

## 2020-12-04 DIAGNOSIS — L40.50 PSORIATIC ARTHRITIS (HCC): ICD-10-CM

## 2020-12-04 DIAGNOSIS — L40.9 PSORIASIS: ICD-10-CM

## 2020-12-04 RX ORDER — ETANERCEPT 50 MG/ML
1 SOLUTION SUBCUTANEOUS
Qty: 2 SYRINGE | Refills: 0 | Status: SHIPPED | OUTPATIENT
Start: 2020-12-04

## 2020-12-04 NOTE — TELEPHONE ENCOUNTER
Pt is following up on this request. He states he plans to make a my chart appointment with one of the doctors here, can we refill in the meantime?

## 2020-12-04 NOTE — TELEPHONE ENCOUNTER
Patient notified of Dr Argenis Cisneros message and verbalized understanding that he needs to establish care with a new PCP in order to received refills in the future.

## 2020-12-04 NOTE — TELEPHONE ENCOUNTER
Requested Prescriptions     Pending Prescriptions Disp Refills   • ENBREL SURECLICK 50 MG/ML Subcutaneous Solution Auto-injector 4 Syringe 5     Sig: Inject 1 pen into the skin every 7 days.      Last office visit: 9-  Medication last refilled: 10-24

## 2020-12-14 DIAGNOSIS — L40.50 PSORIATIC ARTHRITIS (HCC): ICD-10-CM

## 2020-12-14 DIAGNOSIS — L40.9 PSORIASIS: ICD-10-CM

## 2020-12-17 ENCOUNTER — TELEPHONE (OUTPATIENT)
Dept: INTERNAL MEDICINE CLINIC | Facility: CLINIC | Age: 45
End: 2020-12-17

## 2020-12-17 NOTE — TELEPHONE ENCOUNTER
Informed patient that RN had reached out to Dr. Margo Altamirano office informing them that we cannot refer a patient to an [address] [place] [address] clinician and to call us with any questions. We have not received a call from that office.   Mentioned to patient that he has a PPO and

## 2020-12-30 ENCOUNTER — TELEPHONE (OUTPATIENT)
Dept: INTERNAL MEDICINE CLINIC | Facility: CLINIC | Age: 45
End: 2020-12-30

## 2020-12-30 NOTE — TELEPHONE ENCOUNTER
LVM.  We do not have a record of a PA being submitted for the Enbrel. Cover My Meds was checked and his name did not come up so the only assumption I have is that Enbrel didn't require one.   If his present MD believes he needs one, he (patient) should cont

## 2023-03-17 ENCOUNTER — HOSPITAL ENCOUNTER (EMERGENCY)
Age: 48
Discharge: HOME | End: 2023-03-17
Attending: EMERGENCY MEDICINE

## 2023-03-17 ENCOUNTER — APPOINTMENT (OUTPATIENT)
Dept: GENERAL RADIOLOGY | Age: 48
End: 2023-03-17

## 2023-03-17 VITALS
OXYGEN SATURATION: 99 % | SYSTOLIC BLOOD PRESSURE: 135 MMHG | RESPIRATION RATE: 18 BRPM | HEIGHT: 70 IN | HEART RATE: 78 BPM | TEMPERATURE: 98 F | DIASTOLIC BLOOD PRESSURE: 92 MMHG

## 2023-03-17 DIAGNOSIS — M54.50 ACUTE RIGHT-SIDED LOW BACK PAIN, UNSPECIFIED WHETHER SCIATICA PRESENT: Primary | ICD-10-CM

## 2023-03-17 PROCEDURE — 96372 THER/PROPH/DIAG INJ SC/IM: CPT

## 2023-03-17 PROCEDURE — 10002800 HB RX 250 W HCPCS: Performed by: PHYSICIAN ASSISTANT

## 2023-03-17 PROCEDURE — 99283 EMERGENCY DEPT VISIT LOW MDM: CPT

## 2023-03-17 PROCEDURE — 10002803 HB RX 637: Performed by: PHYSICIAN ASSISTANT

## 2023-03-17 PROCEDURE — 72100 X-RAY EXAM L-S SPINE 2/3 VWS: CPT

## 2023-03-17 RX ORDER — HYDROCODONE BITARTRATE AND ACETAMINOPHEN 5; 325 MG/1; MG/1
1 TABLET ORAL EVERY 6 HOURS PRN
Qty: 16 TABLET | Refills: 0 | Status: SHIPPED | OUTPATIENT
Start: 2023-03-17

## 2023-03-17 RX ORDER — KETOROLAC TROMETHAMINE 30 MG/ML
30 INJECTION, SOLUTION INTRAMUSCULAR; INTRAVENOUS ONCE
Status: COMPLETED | OUTPATIENT
Start: 2023-03-17 | End: 2023-03-17

## 2023-03-17 RX ORDER — TIZANIDINE 2 MG/1
2 TABLET ORAL EVERY 6 HOURS PRN
COMMUNITY

## 2023-03-17 RX ORDER — DEXTROAMPHETAMINE SACCHARATE, AMPHETAMINE ASPARTATE, DEXTROAMPHETAMINE SULFATE AND AMPHETAMINE SULFATE 2.5; 2.5; 2.5; 2.5 MG/1; MG/1; MG/1; MG/1
10 TABLET ORAL DAILY
COMMUNITY

## 2023-03-17 RX ORDER — HYDROCODONE BITARTRATE AND ACETAMINOPHEN 5; 325 MG/1; MG/1
2 TABLET ORAL ONCE
Status: COMPLETED | OUTPATIENT
Start: 2023-03-17 | End: 2023-03-17

## 2023-03-17 RX ADMIN — KETOROLAC TROMETHAMINE 30 MG: 30 INJECTION, SOLUTION INTRAMUSCULAR; INTRAVENOUS at 14:20

## 2023-03-17 RX ADMIN — HYDROCODONE BITARTRATE AND ACETAMINOPHEN 2 TABLET: 5; 325 TABLET ORAL at 14:22

## 2023-03-17 ASSESSMENT — PAIN SCALES - GENERAL
PAINLEVEL_OUTOF10: 8
PAINLEVEL_OUTOF10: 7
PAINLEVEL_OUTOF10: 5

## 2023-03-17 ASSESSMENT — ENCOUNTER SYMPTOMS: BACK PAIN: 1
